# Patient Record
Sex: MALE | Race: WHITE | Employment: UNEMPLOYED | URBAN - METROPOLITAN AREA
[De-identification: names, ages, dates, MRNs, and addresses within clinical notes are randomized per-mention and may not be internally consistent; named-entity substitution may affect disease eponyms.]

---

## 2017-05-02 ENCOUNTER — OFFICE VISIT (OUTPATIENT)
Dept: CARDIOLOGY CLINIC | Age: 57
End: 2017-05-02

## 2017-05-02 VITALS
OXYGEN SATURATION: 94 % | BODY MASS INDEX: 30.62 KG/M2 | HEIGHT: 68 IN | DIASTOLIC BLOOD PRESSURE: 81 MMHG | RESPIRATION RATE: 20 BRPM | HEART RATE: 69 BPM | SYSTOLIC BLOOD PRESSURE: 119 MMHG | WEIGHT: 202 LBS

## 2017-05-02 DIAGNOSIS — R00.2 PALPITATIONS: ICD-10-CM

## 2017-05-02 DIAGNOSIS — R07.9 CHEST PAIN, UNSPECIFIED TYPE: ICD-10-CM

## 2017-05-02 DIAGNOSIS — R06.02 SOB (SHORTNESS OF BREATH): Primary | ICD-10-CM

## 2017-05-02 RX ORDER — ROPINIROLE 0.5 MG/1
TABLET, FILM COATED ORAL
COMMUNITY
Start: 2017-04-11 | End: 2018-03-16

## 2017-05-02 NOTE — PROGRESS NOTES
LAST OFFICE VISIT : 10/27/2016        ICD-10-CM ICD-9-CM   1. SOB (shortness of breath) R06.02 786.05   2. Chest pain, unspecified type R07.9 786.50   3. Palpitations R00.2 785.1            Gsielle Johnson is a 60 y.o. male with dyslipidemia referred for 6 month follow up. Cardiac risk factors: family history, dyslipidemia, remote smoker. I have personally obtained the history from the patient. HISTORY OF PRESENTING ILLNESS      He is doing well. He has gained weight since his last visit. He states his shortness of breath has resolved. The patient denies chest pain/ shortness of breath, orthopnea, PND, LE edema, palpitations, syncope, presyncope or fatigue. ACTIVE PROBLEM LIST     There are no active problems to display for this patient. PAST MEDICAL HISTORY     Past Medical History:   Diagnosis Date    Hyperlipidemia            PAST SURGICAL HISTORY     Past Surgical History:   Procedure Laterality Date    HX LUMBAR DISKECTOMY            ALLERGIES     Not on File       FAMILY HISTORY     History reviewed. No pertinent family history. negative for cardiac disease       SOCIAL HISTORY     Social History     Social History    Marital status:      Spouse name: N/A    Number of children: N/A    Years of education: N/A     Social History Main Topics    Smoking status: Former Smoker    Smokeless tobacco: None    Alcohol use No    Drug use: None    Sexual activity: Not Asked     Other Topics Concern    None     Social History Narrative         MEDICATIONS     Current Outpatient Prescriptions   Medication Sig    rOPINIRole (REQUIP) 0.5 mg tablet     aspirin delayed-release 81 mg tablet Take  by mouth daily.  coenzyme Q-10 (CO Q-10) 200 mg capsule Take 1 Cap by mouth daily.  atorvastatin (LIPITOR) 10 mg tablet Take 1 Tab by mouth daily.  methadone (DOLOPHINE) 10 mg tablet Take  by mouth every eight (8) hours.     carisoprodol (SOMA) 350 mg tablet Take 350 mg by mouth two (2) times daily as needed for Muscle Spasm(s).  clonazePAM (KLONOPIN) 1 mg tablet Take  by mouth nightly as needed. No current facility-administered medications for this visit. I have reviewed the nurses notes, vitals, problem list, allergy list, medical history, family, social history and medications. REVIEW OF SYMPTOMS      General: Pt denies excessive weight gain or loss. Pt is able to conduct ADL's  HEENT: Denies blurred vision, headaches, hearing loss, epistaxis and difficulty swallowing. Respiratory: Denies cough, congestion, shortness of breath, WELCH, wheezing or stridor. Cardiovascular: Denies precordial pain, palpitations, edema or PND  Gastrointestinal: Denies poor appetite, indigestion, abdominal pain or blood in stool  Genitourinary: Denies hematuria, dysuria, increased urinary frequency  Musculoskeletal: Denies joint pain or swelling from muscles or joints  Neurologic: Denies tremor, paresthesias, headache, or sensory motor disturbance  Psychiatric: Denies confusion, insomnia, depression  Integumentray: Denies rash, itching or ulcers. Hematologic: Denies easy bruising, bleeding     PHYSICAL EXAMINATION      Vitals:    05/02/17 1028   BP: 119/81   Pulse: 69   Resp: 20   SpO2: 94%   Weight: 202 lb (91.6 kg)   Height: 5' 8\" (1.727 m)     General: Well developed, in no acute distress. HEENT: No jaundice, oral mucosa moist, no oral ulcers  Neck: Supple, no stiffness, no lymphadenopathy, supple  Heart:  Normal S1/S2 negative S3 or S4. Regular, no murmur, gallop or rub, no jugular venous distention  Respiratory: Clear bilaterally x 4, no wheezing or rales    Extremities:  No edema, normal cap refill, no cyanosis. Musculoskeletal: No clubbing, no deformities  Neuro: A&Ox3, speech clear, gait stable, cooperative, no focal neurologic deficits  Skin: Skin color is normal. No rashes or lesions.  Non diaphoretic, moist.  Vascular: 2+ pulses symmetric in all extremities             DIAGNOSTIC DATA     1. Echo  9/20/16 - EF 55%    2. Lexiscan  9/20/16 - no ischemia    3. Lipids  9/9/16 - , HDL 39, ,          LABORATORY DATA            Lab Results   Component Value Date/Time    WBC 12.8 09/22/2010 12:04 PM    HGB 16.3 09/22/2010 12:04 PM    HCT 49.1 09/22/2010 12:04 PM    PLATELET 570 70/79/5380 12:04 PM    MCV 93.3 09/22/2010 12:04 PM      Lab Results   Component Value Date/Time    Sodium 136 09/22/2010 12:04 PM    Potassium 4.6 09/22/2010 12:04 PM    Chloride 100 09/22/2010 12:04 PM    CO2 29 09/22/2010 12:04 PM    Anion gap 7 09/22/2010 12:04 PM    Glucose 86 09/22/2010 12:04 PM    BUN 10 09/22/2010 12:04 PM    Creatinine 1.0 09/22/2010 12:04 PM    BUN/Creatinine ratio 10 09/22/2010 12:04 PM    GFR est AA >60 09/22/2010 12:04 PM    GFR est non-AA >60 09/22/2010 12:04 PM    Calcium 9.4 09/22/2010 12:04 PM    Bilirubin, total 0.3 09/09/2016 09:15 AM    AST (SGOT) 25 09/09/2016 09:15 AM    Alk. phosphatase 88 09/09/2016 09:15 AM    Protein, total 6.6 09/09/2016 09:15 AM    Albumin 4.3 09/09/2016 09:15 AM    Globulin 3.6 03/10/2010 01:14 PM    A-G Ratio 1.1 03/10/2010 01:14 PM    ALT (SGPT) 28 09/09/2016 09:15 AM           ASSESSMENT/RECOMMENDATIONS:.      1. WELCH  -resolved  -last EF was 55% by echo     2. Dyslipidemia  -LDL still elevated although has not had lipid checked since starting lipitor  -will have FLP today     3. Return in 6 months or prn    Orders Placed This Encounter    HEPATIC FUNCTION PANEL    LIPID PANEL    rOPINIRole (REQUIP) 0.5 mg tablet        Follow-up Disposition:  Return in about 6 months (around 11/2/2017). I have discussed the diagnosis with  Damari Johnson and the intended plan as seen in the above orders. Questions were answered concerning future plans. I have discussed medication side effects and warnings with the patient as well. Thank you,  Lenka Betancur. Robby Kim MD for involving me in the care of  Damari Johnson.  Please do not hesitate to contact me for further questions/concerns. This note was written by jennifer Dixon, as dictated by Veronica Hickey MD.      Armando Castleman. MD [facility], [address]      [facility], [address] [place] [address]      [phone] / [phone] Fax

## 2017-05-02 NOTE — MR AVS SNAPSHOT
Visit Information Date & Time Provider Department Dept. Phone Encounter #  
 5/2/2017 10:20 AM Meredith Gayle MD [facility] Fara Patel [phone] [id number] Follow-up Instructions Return in about 6 months (around 11/2/2017). Upcoming Health Maintenance Date Due Hepatitis C Screening 1960 DTaP/Tdap/Td series (1 - Tdap) 7/6/1979 FOBT Q 1 YEAR AGE 50-75 7/6/2008 INFLUENZA AGE 9 TO ADULT 8/1/2017 Allergies as of 5/2/2017  Review Complete On: 5/2/2017 By: Meredith Gayle MD  
 Not on File Current Immunizations  Never Reviewed No immunizations on file. Not reviewed this visit You Were Diagnosed With   
  
 Codes Comments SOB (shortness of breath)    -  Primary ICD-10-CM: R06.02 
ICD-9-CM: 786.05 Chest pain, unspecified type     ICD-10-CM: R07.9 ICD-9-CM: 786.50 Palpitations     ICD-10-CM: R00.2 ICD-9-CM: 785.1 Vitals BP Pulse Resp Height(growth percentile) Weight(growth percentile) SpO2  
 119/81 (BP 1 Location: Left arm, BP Patient Position: Sitting) 69 20 5' 8\" (1.727 m) 202 lb (91.6 kg) 94% BMI Smoking Status 30.71 kg/m2 Former Smoker Vitals History BMI and BSA Data Body Mass Index Body Surface Area 30.71 kg/m 2 2.1 m 2 Preferred Pharmacy Pharmacy Name Phone [address] [facility], [address] [facility]. [phone] Your Updated Medication List  
  
   
This list is accurate as of: 5/2/17 10:49 AM.  Always use your most recent med list.  
  
  
  
  
 aspirin delayed-release 81 mg tablet Take  by mouth daily. atorvastatin 10 mg tablet Commonly known as:  LIPITOR Take 1 Tab by mouth daily. carisoprodol 350 mg tablet Commonly known as:  SOMA Take 350 mg by mouth two (2) times daily as needed for Muscle Spasm(s). clonazePAM 1 mg tablet Commonly known as:  Segundo Bur Take  by mouth nightly as needed.   
  
 coenzyme Q-10 200 mg capsule Commonly known as:  CO Q-10 Take 1 Cap by mouth daily. methadone 10 mg tablet Commonly known as:  DOLOPHINE Take  by mouth every eight (8) hours. rOPINIRole 0.5 mg tablet Commonly known as:  Karissa Morejon We Performed the Following HEPATIC FUNCTION PANEL [51204 CPT(R)] LIPID PANEL [13453 CPT(R)] Follow-up Instructions Return in about 6 months (around 11/2/2017). Introducing [facility] & [facility]! Filomena Bobby introduces [employer] patient portal. Now you can access parts of your medical record, email your doctor's office, and request medication refills online. 1. In your internet browser, go to [email or web address] 2. Click on the First Time User? Click Here link in the Sign In box. You will see the New Member Sign Up page. 3. Enter your [employer] Access Code exactly as it appears below. You will not need to use this code after youve completed the sign-up process. If you do not sign up before the expiration date, you must request a new code. · [employer] Access Code: [email or web address] Expires: 7/31/2017 10:49 AM 
 
4. Enter the last four digits of your Social Security Number (xxxx) and Date of Birth (mm/dd/yyyy) as indicated and click Submit. You will be taken to the next sign-up page. 5. Create a [employer] ID. This will be your [employer] login ID and cannot be changed, so think of one that is secure and easy to remember. 6. Create a [employer] password. You can change your password at any time. 7. Enter your Password Reset Question and Answer. This can be used at a later time if you forget your password. 8. Enter your e-mail address. You will receive e-mail notification when new information is available in [address] 9. Click Sign Up. You can now view and download portions of your medical record. 10. Click the Download Summary menu link to download a portable copy of your medical information.  
 
If you have questions, please visit the Frequently Asked Questions section of the [employer] website. Remember, [employer] is NOT to be used for urgent needs. For medical emergencies, dial 911. Now available from your iPhone and Android! Please provide this summary of care documentation to your next provider. Your primary care clinician is listed as Maurilio Reed. If you have any questions after today's visit, please call [phone].

## 2017-05-02 NOTE — PROGRESS NOTES
Visit Vitals    /81 (BP 1 Location: Left arm, BP Patient Position: Sitting)    Pulse 69    Resp 20    Ht 5' 8\" (1.727 m)    Wt 202 lb (91.6 kg)    SpO2 94%    BMI 30.71 kg/m2

## 2017-05-03 LAB
ALBUMIN SERPL-MCNC: 4.4 G/DL (ref 3.5–5.5)
ALP SERPL-CCNC: 90 IU/L (ref 39–117)
ALT SERPL-CCNC: 17 IU/L (ref 0–44)
AST SERPL-CCNC: 23 IU/L (ref 0–40)
BILIRUB DIRECT SERPL-MCNC: 0.11 MG/DL (ref 0–0.4)
BILIRUB SERPL-MCNC: 0.3 MG/DL (ref 0–1.2)
CHOLEST SERPL-MCNC: 145 MG/DL (ref 100–199)
HDLC SERPL-MCNC: 44 MG/DL
INTERPRETATION, 910389: NORMAL
LDLC SERPL CALC-MCNC: 85 MG/DL (ref 0–99)
PROT SERPL-MCNC: 7.1 G/DL (ref 6–8.5)
TRIGL SERPL-MCNC: 78 MG/DL (ref 0–149)
VLDLC SERPL CALC-MCNC: 16 MG/DL (ref 5–40)

## 2017-05-23 ENCOUNTER — TELEPHONE (OUTPATIENT)
Dept: CARDIOLOGY CLINIC | Age: 57
End: 2017-05-23

## 2017-05-23 DIAGNOSIS — E78.00 ELEVATED CHOLESTEROL: Primary | ICD-10-CM

## 2018-03-16 ENCOUNTER — OFFICE VISIT (OUTPATIENT)
Dept: CARDIOLOGY CLINIC | Age: 58
End: 2018-03-16

## 2018-03-16 VITALS
BODY MASS INDEX: 32.89 KG/M2 | HEART RATE: 68 BPM | WEIGHT: 217 LBS | HEIGHT: 68 IN | DIASTOLIC BLOOD PRESSURE: 80 MMHG | SYSTOLIC BLOOD PRESSURE: 140 MMHG

## 2018-03-16 DIAGNOSIS — R00.2 PALPITATIONS: Primary | ICD-10-CM

## 2018-03-16 DIAGNOSIS — R06.02 SOB (SHORTNESS OF BREATH): ICD-10-CM

## 2018-03-16 DIAGNOSIS — R07.9 CHEST PAIN, UNSPECIFIED TYPE: ICD-10-CM

## 2018-03-16 RX ORDER — ROSUVASTATIN CALCIUM 5 MG/1
5 TABLET, COATED ORAL
Qty: 30 TAB | Refills: 5 | Status: SHIPPED | OUTPATIENT
Start: 2018-03-16

## 2018-03-16 NOTE — PROGRESS NOTES
Visit Vitals    /80    Pulse 68    Ht 5' 8\" (1.727 m)    Wt 217 lb (98.4 kg)    BMI 32.99 kg/m2     Medication changes for this OV VO Dr Diallo Marker

## 2018-03-16 NOTE — MR AVS SNAPSHOT
[address] [address] 
[phone] Patient: Hong Tse MRN: [id number] XKE:[id number] Visit Information Date & Time Provider Department Dept. Phone Encounter #  
 3/16/2018 11:20 AM Bud Cleary MD [facility] Ras Pope [phone] [id number] Follow-up Instructions Return in about 6 months (around 9/16/2018). Upcoming Health Maintenance Date Due Hepatitis C Screening 1960 DTaP/Tdap/Td series (1 - Tdap) 7/6/1979 FOBT Q 1 YEAR AGE 50-75 7/6/2008 Influenza Age 5 to Adult 8/1/2017 Allergies as of 3/16/2018  Review Complete On: 3/16/2018 By: Bud Cleary MD  
 No Known Allergies Current Immunizations  Never Reviewed No immunizations on file. Not reviewed this visit You Were Diagnosed With   
  
 Codes Comments Palpitations    -  Primary ICD-10-CM: R00.2 ICD-9-CM: 785.1 SOB (shortness of breath)     ICD-10-CM: R06.02 
ICD-9-CM: 786.05 Chest pain, unspecified type     ICD-10-CM: R07.9 ICD-9-CM: 786.50 Vitals BP Pulse Height(growth percentile) Weight(growth percentile) BMI Smoking Status 140/80 68 5' 8\" (1.727 m) 217 lb (98.4 kg) 32.99 kg/m2 Former Smoker Vitals History BMI and BSA Data Body Mass Index Body Surface Area  
 32.99 kg/m 2 2.17 m 2 Preferred Pharmacy Pharmacy Name Phone [address] [facility], [address] [facility]. [phone] Your Updated Medication List  
  
   
This list is accurate as of 3/16/18 11:59 AM.  Always use your most recent med list.  
  
  
  
  
 aspirin delayed-release 81 mg tablet Take  by mouth daily. coenzyme Q-10 200 mg capsule Commonly known as:  CO Q-10 Take 1 Cap by mouth daily. methadone 10 mg tablet Commonly known as:  DOLOPHINE Take  by mouth every eight (8) hours. rosuvastatin 5 mg tablet Commonly known as:  CRESTOR Take 1 Tab by mouth every Monday, Wednesday, Friday. Prescriptions Sent to Pharmacy Refills  
 rosuvastatin (CRESTOR) 5 mg tablet 5 Sig: Take 1 Tab by mouth every Monday, Wednesday, Friday. Class: Normal  
 Pharmacy: [facility]  #: [phone] Route: Oral  
  
We Performed the Following AMB POC EKG ROUTINE W/ 12 LEADS, INTER & REP [61998 CPT(R)] HEPATIC FUNCTION PANEL [42227 CPT(R)] LIPID PANEL [58808 CPT(R)] Follow-up Instructions Return in about 6 months (around 9/16/2018). Introducing [facility] [place] & [facility]! Oriana Weiss introduces [employer] patient portal. Now you can access parts of your medical record, email your doctor's office, and request medication refills online. 1. In your internet browser, go to [email or web address] 2. Click on the First Time User? Click Here link in the Sign In box. You will see the New Member Sign Up page. 3. Enter your Smart Patients Access Code exactly as it appears below. You will not need to use this code after youve completed the sign-up process. If you do not sign up before the expiration date, you must request a new code. · Smart Patients Access Code: [email or web address] Expires: 6/14/2018 11:30 AM 
 
4. Enter the last four digits of your Social Security Number (xxxx) and Date of Birth (mm/dd/yyyy) as indicated and click Submit. You will be taken to the next sign-up page. 5. Create a [employer] ID. This will be your Smart Patients login ID and cannot be changed, so think of one that is secure and easy to remember. 6. Create a Smart Patients password. You can change your password at any time. 7. Enter your Password Reset Question and Answer. This can be used at a later time if you forget your password. 8. Enter your e-mail address. You will receive e-mail notification when new information is available in [address] 9. Click Sign Up. You can now view and download portions of your medical record.  
10. Click the [place] link to download a portable copy of your medical information. If you have questions, please visit the Frequently Asked Questions section of the [employer] website. Remember, [employer] is NOT to be used for urgent needs. For medical emergencies, dial 911. Now available from your iPhone and Android! Please provide this summary of care documentation to your next provider. Your primary care clinician is listed as Judy Cole. If you have any questions after today's visit, please call [phone].

## 2018-03-16 NOTE — PROGRESS NOTES
LAST OFFICE VISIT : Visit date not found        ICD-10-CM ICD-9-CM   1. Palpitations R00.2 785.1   2. SOB (shortness of breath) R06.02 786.05   3. Chest pain, unspecified type R07.9 786.50            Juli Strickland is a 59 y.o. male with dyslipidemia referred for follow up. Cardiac risk factors: family history, dyslipidemia, male gender  I have personally obtained the history from the patient. HISTORY OF PRESENTING ILLNESS     Overall the pt states he is doing well. He reports that he stopped his Lipitor because he was having severe joint pain and tooth pain. The pt states that all his pain went away after taking it and then 2 weeks later he tried taking it again and the pain came back. He reports that his shortness of breath is intermittent. 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 The patient denies chest pain/, orthopnea, PND, LE edema, palpitations, syncope, presyncope or fatigue. ACTIVE PROBLEM LIST     There are no active problems to display for this patient. PAST MEDICAL HISTORY     Past Medical History:   Diagnosis Date    Hyperlipidemia            PAST SURGICAL HISTORY     Past Surgical History:   Procedure Laterality Date    HX LUMBAR DISKECTOMY            ALLERGIES     No Known Allergies       FAMILY HISTORY     No family history on file.  negative for cardiac disease       SOCIAL HISTORY     Social History     Social History    Marital status:      Spouse name: N/A    Number of children: N/A    Years of education: N/A     Social History Main Topics    Smoking status: Former Smoker    Smokeless tobacco: Never Used    Alcohol use No    Drug use: None    Sexual activity: Not Asked Other Topics Concern    None     Social History Narrative         MEDICATIONS     Current Outpatient Prescriptions   Medication Sig    aspirin delayed-release 81 mg tablet Take  by mouth daily.  coenzyme Q-10 (CO Q-10) 200 mg capsule Take 1 Cap by mouth daily.  methadone (DOLOPHINE) 10 mg tablet Take  by mouth every eight (8) hours. No current facility-administered medications for this visit. I have reviewed the nurses notes, vitals, problem list, allergy list, medical history, family, social history and medications. REVIEW OF SYMPTOMS      General: Pt denies excessive weight gain or loss. Pt is able to conduct ADL's  HEENT: Denies blurred vision, headaches, hearing loss, epistaxis and difficulty swallowing. Respiratory: Denies cough, congestion, shortness of breath, WELCH, wheezing or stridor. Cardiovascular: Denies precordial pain, palpitations, edema or PND  Gastrointestinal: Denies poor appetite, indigestion, abdominal pain or blood in stool  Genitourinary: Denies hematuria, dysuria, increased urinary frequency  Musculoskeletal: Denies joint pain or swelling from muscles or joints  Neurologic: Denies tremor, paresthesias, headache, or sensory motor disturbance  Psychiatric: Denies confusion, insomnia, depression  Integumentray: Denies rash, itching or ulcers. Hematologic: Denies easy bruising, bleeding     PHYSICAL EXAMINATION      Vitals:    03/16/18 1129   BP: 140/80   Pulse: 68   Weight: 217 lb (98.4 kg)   Height: 5' 8\" (1.727 m)     General: Well developed, in no acute distress. HEENT: No jaundice, oral mucosa moist, no oral ulcers  Neck: Supple, no stiffness, no lymphadenopathy, supple  Heart:  Normal S1/S2 negative S3 or S4. Regular, no murmur, gallop or rub, no jugular venous distention  Respiratory: Clear bilaterally x 4, no wheezing or rales  Extremities:  No edema, normal cap refill, no cyanosis.   Musculoskeletal: No clubbing, no deformities  Neuro: A&Ox3, speech clear, gait stable, cooperative, no focal neurologic deficits  Skin: Skin color is normal. No rashes or lesions. Non diaphoretic, moist.    EKG: NSR     DIAGNOSTIC DATA     1. Echo  9/20/16 - EF 55% normal    2. Stress test  (9/20/16) (Lexiscan) LVEF 62% normal. no ischemia    3. Lipids  9/9/16 - , HDL 39, ,   5/2/17- , HDL 44, LDL 85, TG 78         LABORATORY DATA            Lab Results   Component Value Date/Time    WBC 12.8 (H) 09/22/2010 12:04 PM    HGB 16.3 09/22/2010 12:04 PM    HCT 49.1 09/22/2010 12:04 PM    PLATELET 837 32/85/4048 12:04 PM    MCV 93.3 (H) 09/22/2010 12:04 PM      Lab Results   Component Value Date/Time    Sodium 136 09/22/2010 12:04 PM    Potassium 4.6 09/22/2010 12:04 PM    Chloride 100 09/22/2010 12:04 PM    CO2 29 09/22/2010 12:04 PM    Anion gap 7 09/22/2010 12:04 PM    Glucose 86 09/22/2010 12:04 PM    BUN 10 09/22/2010 12:04 PM    Creatinine 1.0 09/22/2010 12:04 PM    BUN/Creatinine ratio 10 (L) 09/22/2010 12:04 PM    GFR est AA >60 09/22/2010 12:04 PM    GFR est non-AA >60 09/22/2010 12:04 PM    Calcium 9.4 09/22/2010 12:04 PM    Bilirubin, total 0.3 05/02/2017 11:14 AM    AST (SGOT) 23 05/02/2017 11:14 AM    Alk. phosphatase 90 05/02/2017 11:14 AM    Protein, total 7.1 05/02/2017 11:14 AM    Albumin 4.4 05/02/2017 11:14 AM    Globulin 3.6 03/10/2010 01:14 PM    A-G Ratio 1.1 03/10/2010 01:14 PM    ALT (SGPT) 17 05/02/2017 11:14 AM           ASSESSMENT/RECOMMENDATIONS:.      1. 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 All cardiac testing has been negative. 2. Dyslipidemia  - He was not able to tolerate Lipitor due to joint and teeth pain. I will start him on Crestor 5 mg 3 days a week. I have given him a lab slip to have these checked in 2 months. 3. Return in 6 months or PRN.      Orders Placed This Encounter    AMB POC EKG ROUTINE W/ 12 LEADS, INTER & REP     Order Specific Question:   Reason for Exam:     Answer:   palp        Follow-up Disposition:  Return in about 6 months (around 9/16/2018). I have discussed the diagnosis with  Crow Calderon and the intended plan as seen in the above orders. Questions were answered concerning future plans. I have discussed medication side effects and warnings with the patient as well. Thank you,  Darcie Moctezuma MD for involving me in the care of  Crow Calderon. Please do not hesitate to contact me for further questions/concerns. Written by Rudi Costa, as dictated by Damaris Guardado MD.     Catalino Coelho MD, [facility]    Patient Care Team:  Darcie Rivera MD as PCP - General (Family Practice)  Damaris Guardado MD (Cardiology)    [address]      [phone] / [phone] Fax

## 2018-04-09 DIAGNOSIS — E78.00 HYPERCHOLESTEREMIA: Primary | ICD-10-CM
